# Patient Record
Sex: FEMALE | Race: BLACK OR AFRICAN AMERICAN | NOT HISPANIC OR LATINO | URBAN - METROPOLITAN AREA
[De-identification: names, ages, dates, MRNs, and addresses within clinical notes are randomized per-mention and may not be internally consistent; named-entity substitution may affect disease eponyms.]

---
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
## 2024-01-16 PROCEDURE — 90833 PSYTX W PT W E/M 30 MIN: CPT

## 2024-01-16 PROCEDURE — 99214 OFFICE O/P EST MOD 30 MIN: CPT

## 2024-01-27 PROCEDURE — 99443: CPT

## 2024-03-12 PROCEDURE — 90833 PSYTX W PT W E/M 30 MIN: CPT

## 2024-03-12 PROCEDURE — 99213 OFFICE O/P EST LOW 20 MIN: CPT

## 2024-04-15 NOTE — DISCHARGE NOTE PROVIDER - NSDCFUADDAPPT_GEN_ALL_CORE_FT
Called pt and left msg for pt to call the office regarding results   - please see your PMD when possible

## 2024-04-26 NOTE — ED PEDIATRIC TRIAGE NOTE - HEART RATE (BEATS/MIN)
Attempted to reach patient for:follow up     Outcome:RNCM called patient and left a message for patient to return call.     Next Follow Up:RNCM will try again another day.    112

## 2024-05-14 PROCEDURE — 90833 PSYTX W PT W E/M 30 MIN: CPT

## 2024-05-14 PROCEDURE — 99213 OFFICE O/P EST LOW 20 MIN: CPT
